# Patient Record
Sex: FEMALE | Race: WHITE | NOT HISPANIC OR LATINO | Employment: OTHER | URBAN - METROPOLITAN AREA
[De-identification: names, ages, dates, MRNs, and addresses within clinical notes are randomized per-mention and may not be internally consistent; named-entity substitution may affect disease eponyms.]

---

## 2018-01-12 ENCOUNTER — ALLSCRIPTS OFFICE VISIT (OUTPATIENT)
Dept: OTHER | Facility: OTHER | Age: 83
End: 2018-01-12

## 2021-02-05 ENCOUNTER — APPOINTMENT (EMERGENCY)
Dept: CT IMAGING | Facility: HOSPITAL | Age: 86
End: 2021-02-05
Payer: MEDICARE

## 2021-02-05 ENCOUNTER — TELEPHONE (OUTPATIENT)
Dept: OTHER | Facility: OTHER | Age: 86
End: 2021-02-05

## 2021-02-05 ENCOUNTER — HOSPITAL ENCOUNTER (EMERGENCY)
Facility: HOSPITAL | Age: 86
End: 2021-02-06
Attending: EMERGENCY MEDICINE | Admitting: EMERGENCY MEDICINE
Payer: MEDICARE

## 2021-02-05 DIAGNOSIS — I60.9 SAH (SUBARACHNOID HEMORRHAGE) (HCC): Primary | ICD-10-CM

## 2021-02-05 DIAGNOSIS — S01.81XA LACERATION OF FOREHEAD, INITIAL ENCOUNTER: ICD-10-CM

## 2021-02-05 LAB
ANION GAP SERPL CALCULATED.3IONS-SCNC: 6 MMOL/L (ref 4–13)
BASOPHILS # BLD AUTO: 0.04 THOUSANDS/ΜL (ref 0–0.1)
BASOPHILS NFR BLD AUTO: 1 % (ref 0–1)
BUN SERPL-MCNC: 23 MG/DL (ref 5–25)
CALCIUM SERPL-MCNC: 8.8 MG/DL (ref 8.3–10.1)
CHLORIDE SERPL-SCNC: 98 MMOL/L (ref 100–108)
CO2 SERPL-SCNC: 30 MMOL/L (ref 21–32)
CREAT SERPL-MCNC: 0.98 MG/DL (ref 0.6–1.3)
EOSINOPHIL # BLD AUTO: 0.35 THOUSAND/ΜL (ref 0–0.61)
EOSINOPHIL NFR BLD AUTO: 5 % (ref 0–6)
ERYTHROCYTE [DISTWIDTH] IN BLOOD BY AUTOMATED COUNT: 15.9 % (ref 11.6–15.1)
GFR SERPL CREATININE-BSD FRML MDRD: 47 ML/MIN/1.73SQ M
GLUCOSE SERPL-MCNC: 110 MG/DL (ref 65–140)
HCT VFR BLD AUTO: 35.5 % (ref 34.8–46.1)
HGB BLD-MCNC: 10.8 G/DL (ref 11.5–15.4)
IMM GRANULOCYTES # BLD AUTO: 0.02 THOUSAND/UL (ref 0–0.2)
IMM GRANULOCYTES NFR BLD AUTO: 0 % (ref 0–2)
LYMPHOCYTES # BLD AUTO: 1.84 THOUSANDS/ΜL (ref 0.6–4.47)
LYMPHOCYTES NFR BLD AUTO: 24 % (ref 14–44)
MCH RBC QN AUTO: 25.4 PG (ref 26.8–34.3)
MCHC RBC AUTO-ENTMCNC: 30.4 G/DL (ref 31.4–37.4)
MCV RBC AUTO: 83 FL (ref 82–98)
MONOCYTES # BLD AUTO: 0.63 THOUSAND/ΜL (ref 0.17–1.22)
MONOCYTES NFR BLD AUTO: 8 % (ref 4–12)
NEUTROPHILS # BLD AUTO: 4.82 THOUSANDS/ΜL (ref 1.85–7.62)
NEUTS SEG NFR BLD AUTO: 62 % (ref 43–75)
NRBC BLD AUTO-RTO: 0 /100 WBCS
PLATELET # BLD AUTO: 306 THOUSANDS/UL (ref 149–390)
PMV BLD AUTO: 9 FL (ref 8.9–12.7)
POTASSIUM SERPL-SCNC: 4.4 MMOL/L (ref 3.5–5.3)
RBC # BLD AUTO: 4.26 MILLION/UL (ref 3.81–5.12)
SODIUM SERPL-SCNC: 134 MMOL/L (ref 136–145)
TROPONIN I SERPL-MCNC: <0.02 NG/ML
WBC # BLD AUTO: 7.7 THOUSAND/UL (ref 4.31–10.16)

## 2021-02-05 PROCEDURE — 80048 BASIC METABOLIC PNL TOTAL CA: CPT | Performed by: EMERGENCY MEDICINE

## 2021-02-05 PROCEDURE — 90471 IMMUNIZATION ADMIN: CPT

## 2021-02-05 PROCEDURE — 99285 EMERGENCY DEPT VISIT HI MDM: CPT

## 2021-02-05 PROCEDURE — 99291 CRITICAL CARE FIRST HOUR: CPT | Performed by: EMERGENCY MEDICINE

## 2021-02-05 PROCEDURE — 96365 THER/PROPH/DIAG IV INF INIT: CPT

## 2021-02-05 PROCEDURE — 85025 COMPLETE CBC W/AUTO DIFF WBC: CPT | Performed by: EMERGENCY MEDICINE

## 2021-02-05 PROCEDURE — 84484 ASSAY OF TROPONIN QUANT: CPT | Performed by: EMERGENCY MEDICINE

## 2021-02-05 PROCEDURE — 36415 COLL VENOUS BLD VENIPUNCTURE: CPT | Performed by: EMERGENCY MEDICINE

## 2021-02-05 PROCEDURE — 70450 CT HEAD/BRAIN W/O DYE: CPT

## 2021-02-05 PROCEDURE — 93005 ELECTROCARDIOGRAM TRACING: CPT

## 2021-02-05 PROCEDURE — 72125 CT NECK SPINE W/O DYE: CPT

## 2021-02-05 PROCEDURE — 90715 TDAP VACCINE 7 YRS/> IM: CPT | Performed by: EMERGENCY MEDICINE

## 2021-02-05 RX ORDER — FAMOTIDINE 20 MG/1
20 TABLET, FILM COATED ORAL 2 TIMES DAILY
COMMUNITY

## 2021-02-05 RX ORDER — PRAMIPEXOLE DIHYDROCHLORIDE 0.12 MG/1
0.12 TABLET ORAL
COMMUNITY

## 2021-02-05 RX ORDER — RIBOFLAVIN (VITAMIN B2) 100 MG
100 TABLET ORAL DAILY
COMMUNITY

## 2021-02-05 RX ORDER — LEVOTHYROXINE SODIUM 0.12 MG/1
125 TABLET ORAL DAILY
COMMUNITY

## 2021-02-05 RX ORDER — SERTRALINE HYDROCHLORIDE 25 MG/1
75 TABLET, FILM COATED ORAL
COMMUNITY

## 2021-02-05 RX ORDER — LIDOCAINE HYDROCHLORIDE AND EPINEPHRINE 10; 10 MG/ML; UG/ML
20 INJECTION, SOLUTION INFILTRATION; PERINEURAL ONCE
Status: COMPLETED | OUTPATIENT
Start: 2021-02-05 | End: 2021-02-05

## 2021-02-05 RX ORDER — ASPIRIN 81 MG/1
81 TABLET, CHEWABLE ORAL DAILY
COMMUNITY
End: 2021-02-06 | Stop reason: HOSPADM

## 2021-02-05 RX ORDER — LORAZEPAM 0.5 MG/1
TABLET ORAL EVERY 8 HOURS PRN
COMMUNITY

## 2021-02-05 RX ORDER — ZINC SULFATE 50(220)MG
220 CAPSULE ORAL DAILY
COMMUNITY

## 2021-02-05 RX ORDER — GABAPENTIN 300 MG/1
300 CAPSULE ORAL 3 TIMES DAILY
COMMUNITY

## 2021-02-05 RX ORDER — SENNA PLUS 8.6 MG/1
1 TABLET ORAL DAILY
COMMUNITY

## 2021-02-05 RX ORDER — AMLODIPINE BESYLATE 5 MG/1
5 TABLET ORAL DAILY
COMMUNITY

## 2021-02-05 RX ORDER — MELATONIN
1000 DAILY
COMMUNITY

## 2021-02-05 RX ADMIN — LIDOCAINE HYDROCHLORIDE AND EPINEPHRINE 20 ML: 10; 10 INJECTION, SOLUTION INFILTRATION; PERINEURAL at 22:27

## 2021-02-05 RX ADMIN — TETANUS TOXOID, REDUCED DIPHTHERIA TOXOID AND ACELLULAR PERTUSSIS VACCINE, ADSORBED 0.5 ML: 5; 2.5; 8; 8; 2.5 SUSPENSION INTRAMUSCULAR at 22:27

## 2021-02-05 RX ADMIN — DESMOPRESSIN ACETATE 21.6 MCG: 4 SOLUTION INTRAVENOUS at 23:21

## 2021-02-06 ENCOUNTER — HOSPITAL ENCOUNTER (INPATIENT)
Facility: HOSPITAL | Age: 86
LOS: 1 days | Discharge: NURSING, REHAB OR LONG-TERM CARE | DRG: 084 | End: 2021-02-07
Attending: SURGERY | Admitting: SURGERY
Payer: MEDICARE

## 2021-02-06 ENCOUNTER — APPOINTMENT (INPATIENT)
Dept: RADIOLOGY | Facility: HOSPITAL | Age: 86
DRG: 084 | End: 2021-02-06
Payer: MEDICARE

## 2021-02-06 VITALS
SYSTOLIC BLOOD PRESSURE: 136 MMHG | RESPIRATION RATE: 20 BRPM | DIASTOLIC BLOOD PRESSURE: 60 MMHG | TEMPERATURE: 98.7 F | HEART RATE: 72 BPM | OXYGEN SATURATION: 96 %

## 2021-02-06 VITALS
HEART RATE: 63 BPM | DIASTOLIC BLOOD PRESSURE: 72 MMHG | BODY MASS INDEX: 29.21 KG/M2 | OXYGEN SATURATION: 94 % | HEIGHT: 62 IN | RESPIRATION RATE: 18 BRPM | SYSTOLIC BLOOD PRESSURE: 164 MMHG | WEIGHT: 158.73 LBS | TEMPERATURE: 97.1 F
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
## 2021-02-06 DIAGNOSIS — S06.6X9A TRAUMATIC SUBARACHNOID HEMORRHAGE (HCC): ICD-10-CM

## 2021-02-06 PROBLEM — S01.81XA FOREHEAD LACERATION: Status: ACTIVE | Noted: 2021-02-06

## 2021-02-06 LAB
ALBUMIN SERPL BCP-MCNC: 3.5 G/DL (ref 3.5–5)
ALP SERPL-CCNC: 63 U/L (ref 46–116)
ALT SERPL W P-5'-P-CCNC: 12 U/L (ref 12–78)
ANION GAP SERPL CALCULATED.3IONS-SCNC: 7 MMOL/L (ref 4–13)
AST SERPL W P-5'-P-CCNC: 13 U/L (ref 5–45)
BASOPHILS # BLD AUTO: 0.04 THOUSANDS/ΜL (ref 0–0.1)
BASOPHILS NFR BLD AUTO: 1 % (ref 0–1)
BILIRUB SERPL-MCNC: 0.47 MG/DL (ref 0.2–1)
BUN SERPL-MCNC: 28 MG/DL (ref 5–25)
CALCIUM SERPL-MCNC: 9.1 MG/DL (ref 8.3–10.1)
CHLORIDE SERPL-SCNC: 105 MMOL/L (ref 100–108)
CO2 SERPL-SCNC: 27 MMOL/L (ref 21–32)
CREAT SERPL-MCNC: 1.12 MG/DL (ref 0.6–1.3)
EOSINOPHIL # BLD AUTO: 0.27 THOUSAND/ΜL (ref 0–0.61)
EOSINOPHIL NFR BLD AUTO: 3 % (ref 0–6)
ERYTHROCYTE [DISTWIDTH] IN BLOOD BY AUTOMATED COUNT: 16.2 % (ref 11.6–15.1)
FLUAV RNA RESP QL NAA+PROBE: NEGATIVE
FLUBV RNA RESP QL NAA+PROBE: NEGATIVE
GFR SERPL CREATININE-BSD FRML MDRD: 40 ML/MIN/1.73SQ M
GLUCOSE SERPL-MCNC: 117 MG/DL (ref 65–140)
HCT VFR BLD AUTO: 32.1 % (ref 34.8–46.1)
HGB BLD-MCNC: 9.8 G/DL (ref 11.5–15.4)
IMM GRANULOCYTES # BLD AUTO: 0.02 THOUSAND/UL (ref 0–0.2)
IMM GRANULOCYTES NFR BLD AUTO: 0 % (ref 0–2)
LYMPHOCYTES # BLD AUTO: 1.79 THOUSANDS/ΜL (ref 0.6–4.47)
LYMPHOCYTES NFR BLD AUTO: 22 % (ref 14–44)
MCH RBC QN AUTO: 25.4 PG (ref 26.8–34.3)
MCHC RBC AUTO-ENTMCNC: 30.5 G/DL (ref 31.4–37.4)
MCV RBC AUTO: 83 FL (ref 82–98)
MONOCYTES # BLD AUTO: 0.79 THOUSAND/ΜL (ref 0.17–1.22)
MONOCYTES NFR BLD AUTO: 10 % (ref 4–12)
NEUTROPHILS # BLD AUTO: 5.41 THOUSANDS/ΜL (ref 1.85–7.62)
NEUTS SEG NFR BLD AUTO: 64 % (ref 43–75)
NRBC BLD AUTO-RTO: 0 /100 WBCS
PLATELET # BLD AUTO: 303 THOUSANDS/UL (ref 149–390)
PMV BLD AUTO: 9 FL (ref 8.9–12.7)
POTASSIUM SERPL-SCNC: 4.2 MMOL/L (ref 3.5–5.3)
PROT SERPL-MCNC: 7.2 G/DL (ref 6.4–8.2)
RBC # BLD AUTO: 3.86 MILLION/UL (ref 3.81–5.12)
RSV RNA RESP QL NAA+PROBE: NEGATIVE
SARS-COV-2 RNA RESP QL NAA+PROBE: NEGATIVE
SODIUM SERPL-SCNC: 139 MMOL/L (ref 136–145)
WBC # BLD AUTO: 8.32 THOUSAND/UL (ref 4.31–10.16)

## 2021-02-06 PROCEDURE — 0241U HB NFCT DS VIR RESP RNA 4 TRGT: CPT | Performed by: SURGERY

## 2021-02-06 PROCEDURE — 12013 RPR F/E/E/N/L/M 2.6-5.0 CM: CPT | Performed by: EMERGENCY MEDICINE

## 2021-02-06 PROCEDURE — NC001 PR NO CHARGE: Performed by: NURSE PRACTITIONER

## 2021-02-06 PROCEDURE — 80053 COMPREHEN METABOLIC PANEL: CPT | Performed by: EMERGENCY MEDICINE

## 2021-02-06 PROCEDURE — 70450 CT HEAD/BRAIN W/O DYE: CPT

## 2021-02-06 PROCEDURE — 99285 EMERGENCY DEPT VISIT HI MDM: CPT

## 2021-02-06 PROCEDURE — 36415 COLL VENOUS BLD VENIPUNCTURE: CPT | Performed by: EMERGENCY MEDICINE

## 2021-02-06 PROCEDURE — G1004 CDSM NDSC: HCPCS

## 2021-02-06 PROCEDURE — 99222 1ST HOSP IP/OBS MODERATE 55: CPT | Performed by: SURGERY

## 2021-02-06 PROCEDURE — 99204 OFFICE O/P NEW MOD 45 MIN: CPT | Performed by: PHYSICIAN ASSISTANT

## 2021-02-06 PROCEDURE — 85025 COMPLETE CBC W/AUTO DIFF WBC: CPT | Performed by: EMERGENCY MEDICINE

## 2021-02-06 RX ORDER — SENNOSIDES 8.6 MG
2 TABLET ORAL
Status: DISCONTINUED | OUTPATIENT
Start: 2021-02-06 | End: 2021-02-07 | Stop reason: HOSPADM

## 2021-02-06 RX ORDER — MULTIVIT WITH MINERALS/LUTEIN
125 TABLET ORAL DAILY
Status: DISCONTINUED | OUTPATIENT
Start: 2021-02-06 | End: 2021-02-07 | Stop reason: HOSPADM

## 2021-02-06 RX ORDER — AMLODIPINE BESYLATE 5 MG/1
5 TABLET ORAL DAILY
Status: DISCONTINUED | OUTPATIENT
Start: 2021-02-06 | End: 2021-02-07 | Stop reason: HOSPADM

## 2021-02-06 RX ORDER — FAMOTIDINE 20 MG/1
20 TABLET, FILM COATED ORAL DAILY
Status: DISCONTINUED | OUTPATIENT
Start: 2021-02-06 | End: 2021-02-07 | Stop reason: HOSPADM

## 2021-02-06 RX ORDER — MELATONIN
1000 DAILY
Status: DISCONTINUED | OUTPATIENT
Start: 2021-02-06 | End: 2021-02-07 | Stop reason: HOSPADM

## 2021-02-06 RX ORDER — ZINC SULFATE 50(220)MG
220 CAPSULE ORAL DAILY
Status: DISCONTINUED | OUTPATIENT
Start: 2021-02-06 | End: 2021-02-07 | Stop reason: HOSPADM

## 2021-02-06 RX ORDER — ACETAMINOPHEN 325 MG/1
975 TABLET ORAL EVERY 8 HOURS PRN
Status: DISCONTINUED | OUTPATIENT
Start: 2021-02-06 | End: 2021-02-07 | Stop reason: HOSPADM

## 2021-02-06 RX ORDER — LEVOTHYROXINE SODIUM 0.12 MG/1
125 TABLET ORAL
Status: DISCONTINUED | OUTPATIENT
Start: 2021-02-06 | End: 2021-02-07 | Stop reason: HOSPADM

## 2021-02-06 RX ORDER — GABAPENTIN 300 MG/1
300 CAPSULE ORAL 3 TIMES DAILY PRN
Status: DISCONTINUED | OUTPATIENT
Start: 2021-02-06 | End: 2021-02-07 | Stop reason: HOSPADM

## 2021-02-06 RX ORDER — LORAZEPAM 0.5 MG/1
0.5 TABLET ORAL EVERY 8 HOURS PRN
Status: DISCONTINUED | OUTPATIENT
Start: 2021-02-06 | End: 2021-02-07 | Stop reason: HOSPADM

## 2021-02-06 RX ORDER — GINSENG 100 MG
1 CAPSULE ORAL 2 TIMES DAILY
Status: DISCONTINUED | OUTPATIENT
Start: 2021-02-06 | End: 2021-02-07 | Stop reason: HOSPADM

## 2021-02-06 RX ORDER — NYSTATIN 100000 [USP'U]/G
POWDER TOPICAL 2 TIMES DAILY PRN
Qty: 15 G | Refills: 0 | Status: SHIPPED | OUTPATIENT
Start: 2021-02-06

## 2021-02-06 RX ORDER — PRAMIPEXOLE DIHYDROCHLORIDE 0.25 MG/1
0.12 TABLET ORAL
Status: DISCONTINUED | OUTPATIENT
Start: 2021-02-06 | End: 2021-02-07 | Stop reason: HOSPADM

## 2021-02-06 RX ORDER — NYSTATIN 100000 [USP'U]/G
POWDER TOPICAL 2 TIMES DAILY PRN
Qty: 15 G | Refills: 0 | Status: SHIPPED | OUTPATIENT
Start: 2021-02-06 | End: 2021-02-06

## 2021-02-06 RX ORDER — ACETAMINOPHEN 325 MG/1
975 TABLET ORAL EVERY 8 HOURS PRN
Qty: 30 TABLET | Refills: 0 | Status: SHIPPED | OUTPATIENT
Start: 2021-02-06

## 2021-02-06 RX ORDER — MINERAL OIL AND PETROLATUM 150; 830 MG/G; MG/G
OINTMENT OPHTHALMIC
Status: DISCONTINUED | OUTPATIENT
Start: 2021-02-06 | End: 2021-02-07 | Stop reason: HOSPADM

## 2021-02-06 RX ORDER — NYSTATIN 100000 [USP'U]/G
POWDER TOPICAL 2 TIMES DAILY PRN
Status: DISCONTINUED | OUTPATIENT
Start: 2021-02-06 | End: 2021-02-07 | Stop reason: HOSPADM

## 2021-02-06 RX ORDER — ACETAMINOPHEN 325 MG/1
975 TABLET ORAL EVERY 8 HOURS PRN
Qty: 30 TABLET | Refills: 0 | Status: SHIPPED | OUTPATIENT
Start: 2021-02-06 | End: 2021-02-06

## 2021-02-06 RX ORDER — BISACODYL 10 MG
10 SUPPOSITORY, RECTAL RECTAL DAILY PRN
Status: DISCONTINUED | OUTPATIENT
Start: 2021-02-06 | End: 2021-02-07 | Stop reason: HOSPADM

## 2021-02-06 RX ORDER — POLYETHYLENE GLYCOL 3350 17 G/17G
17 POWDER, FOR SOLUTION ORAL DAILY PRN
Status: DISCONTINUED | OUTPATIENT
Start: 2021-02-06 | End: 2021-02-07 | Stop reason: HOSPADM

## 2021-02-06 RX ADMIN — AMLODIPINE BESYLATE 5 MG: 5 TABLET ORAL at 11:49

## 2021-02-06 RX ADMIN — LORAZEPAM 0.5 MG: 0.5 TABLET ORAL at 11:54

## 2021-02-06 RX ADMIN — LEVOTHYROXINE SODIUM 125 MCG: 125 TABLET ORAL at 11:47

## 2021-02-06 RX ADMIN — METOPROLOL TARTRATE 25 MG: 25 TABLET, FILM COATED ORAL at 11:54

## 2021-02-06 RX ADMIN — CARBIDOPA AND LEVODOPA 1 TABLET: 25; 100 TABLET ORAL at 11:49

## 2021-02-06 NOTE — ASSESSMENT & PLAN NOTE
Small 5 mm left frontoparietal hyperdensity concerning for traumatic hemorrhage  · Reported fall from wheelchair on aspirin 81 mg daily  · Patient unable to offer complaints    Imaging:  · CT head without 2/5/21:  Four 5 mm for the hyperdensity in the left frontoparietal convexity noted both on axial and coronal imaging concerning for traumatic hemorrhage  Age-related atrophy and prominent ventricles  Plan:  · Continue monitor exam  · Per conversation with trauma, patient appears grossly at baseline  · CT head imaging reviewed  Primary team has ordered repeat scan for 9:00 a m  Manny Pierce · Hold all anti-platelet and anticoagulation medications for two weeks  · Recommend risk versus benefit conversation regarding resuming aspirin with family physician  · Would defer Keppra given age of small hemorrhage  · No nerve surgical intervention indicated  Patient may follow up on as-needed basis  · Call if any questions or concerns

## 2021-02-06 NOTE — OCCUPATIONAL THERAPY NOTE
Occupational Therapy         Patient Name: Josemanuel Hodge  LJZDI'H Date: 2/6/2021 02/06/21 1128   OT Last Visit   OT Visit Date 02/06/21   Note Type   Note type Screen   Cancel Reasons Other     Pt is a LT resident of [address] [place] [address] is for pt to return to facility later today - no acute OT needs indicated at this time - d/c from caseload    Candy Caldwell

## 2021-02-06 NOTE — CONSULTS
Consult- Soham Lanza 5/21/1919, 80 y o  female MRN: [id number]    Unit/Bed#: ED 27 Encounter: [id number]    Primary Care Provider: Mariano Diaz MD   Date and time admitted to hospital: 2/6/2021 12:31 AM    Patient seen and examined 2/6/21 approx 0745am    ADDENDUM: Repeat CT head completed and reviewed  Final report pending  On personal review, hemorrhage appears grossly stable  No follow-up needed  Okay for dc from neurosurgical standpoint  Inpatient consult to Neurosurgery  Consult performed by: Israel Rouse PA-C  Consult ordered by: Maegan Servin DO        * Traumatic subarachnoid hemorrhage [facility])  Assessment & Plan  Small 5 mm left frontoparietal hyperdensity concerning for traumatic hemorrhage  · Reported fall from wheelchair on aspirin 81 mg daily  · Patient unable to offer complaints    Imaging:  · CT head without 2/5/21:  Four 5 mm for the hyperdensity in the left frontoparietal convexity noted both on axial and coronal imaging concerning for traumatic hemorrhage  Age-related atrophy and prominent ventricles  Plan:  · Continue monitor exam  · Per conversation with trauma, patient appears grossly at baseline  · CT head imaging reviewed  Primary team has ordered repeat scan for 9:00 a m  Enrique Maravilla · Hold all anti-platelet and anticoagulation medications for two weeks  · Recommend risk versus benefit conversation regarding resuming aspirin with family physician  · Would defer Keppra given age of small hemorrhage  · No nerve surgical intervention indicated  Patient may follow up on as-needed basis  · Call if any questions or concerns  History of Present Illness     HPI: Soham Lanza is a 80 y o  female with PMH including TIA on aspirin 81 mg daily, Parkinson's disease, history of myocardial infarction, hypothyroidism, COPD, Alzheimer's disease who presents following fall from wheelchair  Patient of progressive Alzheimer's disease and unable to provide history    Per chart review patient sustained a fall from her wheelchair  She presented originally to upper back for a CT head demonstrated a small subarachnoid hemorrhage  She was given DDAVP for reversal of aspirin use  Her facial laceration was repaired in the emergency room and patient was transferred to [facility] for ongoing evaluation  Review of Systems   Unable to perform ROS: Dementia       Historical Information   Past Medical History:   Diagnosis Date    Alzheimer disease ([place] [address] )     Anxiety     Atherosclerotic heart disease of native coronary artery without angina pectoris     COPD (chronic obstructive pulmonary disease) ([place] [address] )     Hypothyroidism     Long term (current) use of systemic steroids     Major depressive disorder     MI (myocardial infarction) ([place] [address] )     Parkinson disease ([place] [address] )     Spinal stenosis     TIA (transient ischemic attack)      History reviewed  No pertinent surgical history  Social History     Substance and Sexual Activity   Alcohol Use Not Currently     Social History     Substance and Sexual Activity   Drug Use Not Currently     Social History     Tobacco Use   Smoking Status Former Smoker   Smokeless Tobacco Never Used     History reviewed  No pertinent family history      Meds/Allergies   all current active meds have been reviewed and current meds:   Current Facility-Administered Medications   Medication Dose Route Frequency    acetaminophen (TYLENOL) tablet 975 mg  975 mg Oral Q8H PRN    amLODIPine (NORVASC) tablet 5 mg  5 mg Oral Daily    artificial tear (LUBRIFRESH P M ) ophthalmic ointment   Both Eyes HS    ascorbic acid (VITAMIN C) tablet 125 mg  125 mg Oral Daily    bacitracin topical ointment 1 small application  1 small application Topical BID    bisacodyl (DULCOLAX) rectal suppository 10 mg  10 mg Rectal Daily PRN    carbidopa-levodopa (SINEMET)  mg per tablet 1 tablet  1 tablet Oral TID    cholecalciferol (VITAMIN D3) tablet 1,000 Units  1,000 Units Oral Daily  famotidine (PEPCID) tablet 20 mg  20 mg Oral Daily    gabapentin (NEURONTIN) capsule 300 mg  300 mg Oral TID PRN    levothyroxine tablet 125 mcg  125 mcg Oral Early Morning    LORazepam (ATIVAN) tablet 0 5 mg  0 5 mg Oral Q8H PRN    metoprolol tartrate (LOPRESSOR) tablet 25 mg  25 mg Oral Q12H JEMAL    nystatin (MYCOSTATIN) powder   Topical BID PRN    polyethylene glycol (MIRALAX) packet 17 g  17 g Oral Daily PRN    pramipexole (MIRAPEX) tablet 0 125 mg  0 125 mg Oral HS    senna (SENOKOT) tablet 17 2 mg  2 tablet Oral HS    sertraline (ZOLOFT) tablet 75 mg  75 mg Oral HS    zinc sulfate (ZINCATE) capsule 220 mg  220 mg Oral Daily     No Known Allergies    Objective   I/O     None          Physical Exam  Constitutional:       General: She is not in acute distress  Appearance: She is not ill-appearing  HENT:      Head:      Comments: Forehead laceration     Left Ear: External ear normal       Nose: Nose normal       Mouth/Throat:      Mouth: Mucous membranes are moist    Neck:      Musculoskeletal: No muscular tenderness  Cardiovascular:      Rate and Rhythm: Normal rate  Pulmonary:      Effort: Pulmonary effort is normal  No respiratory distress  Abdominal:      Palpations: Abdomen is soft  Musculoskeletal:         General: No tenderness  Skin:     General: Skin is warm and dry  Neurological:      Mental Status: Mental status is at baseline  Psychiatric:         Behavior: Behavior normal        Neurologic Exam     Mental Status   Attention: decreased  Concentration: decreased  Level of consciousness: drowsy  Knowledge: poor  Abnormal comprehension  Intermittent words but does not answer questions  Does not follow verbal commands  Cranial Nerves     CN III, IV, VI   Right pupil: Shape: regular  Left pupil: Shape: regular     Conjugate gaze: present    CN VII   Right facial weakness: none  Left facial weakness: none    Motor Exam Small spontaneous movements BUE  Will move LE to tactile stimuli   Limited exam     Sensory Exam   Appears intact to crude touch as she moves extremity to palpation     Gait, Coordination, and Reflexes     Tremor   Resting tremor: present  Intention tremor: absent  Action tremor: absent    Reflexes   Right Coates: absent  Left Coates: absent  Right ankle clonus: absent  Left ankle clonus: absent        Vitals:Blood pressure 159/87, pulse 72, temperature 98 7 °F (37 1 °C), temperature source Oral, resp  rate 16, SpO2 92 %  ,There is no height or weight on file to calculate BMI  Lab Results:   Results from last 7 days   Lab Units 02/06/21  0541 02/05/21  2139   WBC Thousand/uL 8 32 7 70   HEMOGLOBIN g/dL 9 8* 10 8*   HEMATOCRIT % 32 1* 35 5   PLATELETS Thousands/uL 303 306   NEUTROS PCT % 64 62   MONOS PCT % 10 8     Results from last 7 days   Lab Units 02/06/21  0541 02/05/21  2139   POTASSIUM mmol/L 4 2 4 4   CHLORIDE mmol/L 105 98*   CO2 mmol/L 27 30   BUN mg/dL 28* 23   CREATININE mg/dL 1 12 0 98   CALCIUM mg/dL 9 1 8 8   ALK PHOS U/L 63  --    ALT U/L 12  --    AST U/L 13  --                  No results found for: TROPONINT  ABG:No results found for: PHART, USL0EUO, PO2ART, QEM3HWD, P0TKJJCK, BEART, SOURCE    Imaging Studies: I have personally reviewed pertinent reports  and I have personally reviewed pertinent films in PACS    CT head wo   CT cervical wo    EKG, Pathology, and Other Studies: none    VTE Prophylaxis: Reason for no pharmacologic prophylaxis SAH vs ICH    Code Status: Level 3 - DNAR and DNI  Advance Directive and Living Will:      Power of :    POLST:      Counseling / Coordination of Care  I spent 15 minutes with the patient

## 2021-02-06 NOTE — EMTALA/ACUTE CARE TRANSFER
[facility] EMERGENCY DEPARTMENT  [address] [facility] [address]  Dept: [phone]      [facility] TRANSFER CONSENT    NAME Mikayla Robledo                                         1919                              MRN [id number]    I have been informed of my rights regarding examination, treatment, and transfer   by Dr Boni Pappas DO    Benefits: Specialized equipment and/or services available at the receiving facility (Include comment)________________________    Risks: Potential for delay in receiving treatment, Potential deterioration of medical condition, Loss of IV, Increased discomfort during transfer      Consent for Transfer:  I acknowledge that my medical condition has been evaluated and explained to me by the emergency department physician or other qualified medical person and/or my attending physician, who has recommended that I be transferred to the service of  Accepting Physician: Dr Pedro Leyden at [address], [address] : [facility]  The above potential benefits of such transfer, the potential risks associated with such transfer, and the probable risks of not being transferred have been explained to me, and I fully understand them  The doctor has explained that, in my case, the benefits of transfer outweigh the risks  I agree to be transferred  I authorize the performance of emergency medical procedures and treatments upon me in both transit and upon arrival at the receiving facility  Additionally, I authorize the release of any and all medical records to the receiving facility and request they be transported with me, if possible  I understand that the safest mode of transportation during a medical emergency is an ambulance and that the Hospital advocates the use of this mode of transport   Risks of traveling to the receiving facility by car, including absence of medical control, life sustaining equipment, such as oxygen, and medical personnel has been explained to me and I fully understand them  (KRYSTIN CORRECT BOX BELOW)  [  ]  I consent to the stated transfer and to be transported by ambulance/helicopter  [  ]  I consent to the stated transfer, but refuse transportation by ambulance and accept full responsibility for my transportation by car  I understand the risks of non-ambulance transfers and I exonerate the Hospital and its staff from any deterioration in my condition that results from this refusal     X___________________________________________    DATE  21  TIME________  Signature of patient or legally responsible individual signing on patient behalf           RELATIONSHIP TO PATIENT_________________________          Provider Certification    NAME Davis Luis                                         1919                              MRN [id number]    A medical screening exam was performed on the above named patient  Based on the examination:    Condition Necessitating Transfer The primary encounter diagnosis was SAH (subarachnoid hemorrhage) ([facility] [address] )  A diagnosis of Laceration of forehead, initial encounter was also pertinent to this visit      Patient Condition: The patient has been stabilized such that within reasonable medical probability, no material deterioration of the patient condition or the condition of the unborn child(elena) is likely to result from the transfer    Reason for Transfer: Level of Care needed not available at this facility    Transfer Requirements: Facility [facility]   · Space available and qualified personnel available for treatment as acknowledged by    · Agreed to accept transfer and to provide appropriate medical treatment as acknowledged by       Dr Kaylin Kennedy  · Appropriate medical records of the examination and treatment of the patient are provided at the time of transfer   [address] _______  · Transfer will be performed by qualified personnel from    and appropriate transfer equipment as required, including the use of necessary and appropriate life support measures  Provider Certification: I have examined the patient and explained the following risks and benefits of being transferred/refusing transfer to the patient/family:  General risk, such as traffic hazards, adverse weather conditions, rough terrain or turbulence, possible failure of equipment (including vehicle or aircraft), or consequences of actions of persons outside the control of the transport personnel, Unanticipated needs of medical equipment and personnel during transport, Risk of worsening condition, The possibility of a transport vehicle being unavailable      Based on these reasonable risks and benefits to the patient and/or the unborn child(elena), and based upon the information available at the time of the patients examination, I certify that the medical benefits reasonably to be expected from the provision of appropriate medical treatments at another medical facility outweigh the increasing risks, if any, to the individuals medical condition, and in the case of labor to the unborn child, from effecting the transfer      X____________________________________________ DATE 02/05/21        TIME_______      ORIGINAL - SEND TO MEDICAL RECORDS   COPY - SEND WITH PATIENT DURING TRANSFER

## 2021-02-06 NOTE — ASSESSMENT & PLAN NOTE
-Fall with SAH, given DDAVP prior to transfer  -Hold ASA/VTE prophylaxis   -Consult neurosurgery  - seen patient  -Follow up head CT in AM - will discuss with Neurosurgery  - Would recommend holding Aspirin, follow up with PCP

## 2021-02-06 NOTE — DISCHARGE INSTRUCTIONS
Laceration   WHAT YOU NEED TO KNOW:   A laceration is an injury to the skin and the soft tissue underneath it  Lacerations can happen anywhere on the body  DISCHARGE INSTRUCTIONS:   Return to the emergency department if:   · You have heavy bleeding or bleeding that does not stop after 10 minutes of holding firm, direct pressure over the wound  · Your wound opens up  Call your doctor if:   · You have a fever or chills  · Your laceration is red, warm, or swollen  · You have red streaks on your skin coming from your wound  · You have white or yellow drainage from the wound that smells bad  · You have pain that gets worse, even after treatment  · You have questions or concerns about your condition or care  Medicines: You may need any of the following:  · Prescription pain medicine  may be given  Ask your healthcare provider how to take this medicine safely  Some prescription pain medicines contain acetaminophen  Do not take other medicines that contain acetaminophen without talking to your healthcare provider  Too much acetaminophen may cause liver damage  Prescription pain medicine may cause constipation  Ask your healthcare provider how to prevent or treat constipation  · Antibiotics  help treat or prevent a bacterial infection  · Take your medicine as directed  Contact your healthcare provider if you think your medicine is not helping or if you have side effects  Tell him or her if you are allergic to any medicine  Keep a list of the medicines, vitamins, and herbs you take  Include the amounts, and when and why you take them  Bring the list or the pill bottles to follow-up visits  Carry your medicine list with you in case of an emergency  Care for your wound as directed:   · Do not get your wound wet  until your healthcare provider says it is okay  Do not soak your wound in water  Do not go swimming until your healthcare provider says it is okay   Carefully wash the wound with soap and water  Gently pat the area dry or allow it to air dry  · Change your bandages  when they get wet, dirty, or after washing  Apply new, clean bandages as directed  Do not apply elastic bandages or tape too tight  Do not put powders or lotions over your incision  · Apply antibiotic ointment as directed  Your healthcare provider may give you antibiotic ointment to put over your wound if you have stitches  If you have strips of tape over your incision, let them dry up and fall off on their own  If they do not fall off within 14 days, gently remove them  If you have glue over your wound, do not remove or pick at it  If your glue comes off, do not replace it with glue that you have at home  · Check your wound every day for signs of infection, such as swelling, redness, or pus  Self-care:   · Apply ice  on your wound for 15 to 20 minutes every hour or as directed  Use an ice pack, or put crushed ice in a plastic bag  Cover it with a towel  Ice helps prevent tissue damage and decreases swelling and pain  · Use a splint as directed  A splint will decrease movement and stress on your wound  It may help it heal faster  A splint may be used for lacerations over joints or areas of your body that bend  Ask your healthcare provider how to apply and remove a splint  · Decrease scarring of your wound  by applying ointments as directed  Do not apply ointments until your healthcare provider says it is okay  You may need to wait until your wound is healed  Ask which ointment to buy and how often to use it  After your wound is healed, use sunscreen over the area when you are out in the sun  You should do this for at least 6 months to 1 year after your injury  Follow up with your doctor as directed: You may need to follow up in 24 to 48 hours to have your wound checked for infection  You will need to return in 3 to 14 days if you have stitches or staples so they can be removed   Care for your wound as directed to prevent infection and help it heal  Write down your questions so you remember to ask them during your visits  © Copyright [address] Information is for End User's use only and may not be sold, redistributed or otherwise used for commercial purposes  All illustrations and images included in CareNotes® are the copyrighted property of A D A M , Inc  or [facility]   The above information is an  only  It is not intended as medical advice for individual conditions or treatments  Talk to your doctor, nurse or pharmacist before following any medical regimen to see if it is safe and effective for you  Neurosurgery discharge instructions following traumatic head bleed:      Do not take any blood thinning medications for 2 weeks (ie  No Advil  No motrin  No ibuprofen  No Aleve  No Aspirin  No fishoil  No heparin  No antiplatelet / no anticoagulation medication)  consider indefinite discontinuation - discuss with family doctor   Refrain from activity that increases chance of trauma to head or falls  Recommend you take fall precaution   No strenuous activity or sports   Return to hospital Emergency Room if you experience worsening / new headache, nausea/vomiting, speech/vision change, seizure, confusion / mental status change, weakness, or other neurological changes

## 2021-02-06 NOTE — CASE MANAGEMENT
Cm rec'd message from medical team  Per medical team, pt is medically stable to discharged  Pt from [facility]  Return referral placed  Per facility, facility is willing to accept pt back and require covid check prior to discharged  Unit RN made aware of covid check prior to discharged  Facility rec'd covid result  Pt to room 232  [phone] for report    Transportation set up with [facility] at 1400

## 2021-02-06 NOTE — ED NOTES
Transport: [facility] 0030 to [facility] ED     Accepting physician is Dr Patience Boothe    [phone] for nurse to nurse report     Jamel Mcbride RN  02/05/21 [phone]

## 2021-02-06 NOTE — H&P
H&P Exam - Tere Herrera 30 [place] [address]  female MRN: [id number]  Unit/Bed#: ED 27 Encounter: [id number]    Assessment/Plan   Trauma Alert: Other transfer   Model of Arrival: Ambulance  Trauma Team: Residents Mart Mixon, Attending [employer]  Consultants: None    Trauma Active Problems: [address]    Trauma Plan: consult neurosurgery, hold ASA/VTE prophylaxis, f/u head CT in am     Chief Complaint: no complaints    History of Present Illness   HPI:  Jie Ortez is a [place] y o  female with a past medical history of Parkinson's disease, dementia, HTN, CAD on 81mg ASA daily who initially presented to [place] [address] for fall from her wheelchair  She had a head CT which revealed small SAH, given DDAVP for reversal  She had a facial laceration that was repaired in the ED  subsequently transferred to [facility] for trauma evaluation  Further history and ROS unavailable secondary to dementia  Mechanism:Fall    Review of Systems   Unable to perform ROS: Dementia       12-point, complete review of systems was reviewed and negative except as stated above  Historical Information   History is unobtainable from the patient due to dementia  Efforts to obtain history included the following sources: obtained from other records    Past Medical History:   Diagnosis Date    [facility]     Anxiety     Atherosclerotic heart disease of native coronary artery without angina pectoris     [facility]ch[facility] obstructive [facility]     Hypothyroidism     Long term (current) use of systemic steroids     Major depressive disorder     [facility]ocardial infarction) [facility]     Parkinson disease [facility]     Spinal stenosis     TIA (transient ischemic attack)      History reviewed  No pertinent surgical history    Social History   Social History     Substance and Sexual Activity   Alcohol Use Not Currently     Social History     Substance and Sexual Activity   Drug Use Not Currently     Social History     Tobacco Use   Smoking Status Former Smoker   Smokeless Tobacco Never Used     E-Cigarette/Vaping     E-Cigarette/Vaping Substances    Nicotine No     THC No     CBD No     Flavoring No     Other No     Unknown No      Immunization History   Administered Date(s) Administered    Tdap 02/05/2021     Last Tetanus: 2/5/2021  Family History: Non-contributory  Unable to obtain/limited by: dementia      Meds/Allergies   all current active meds have been reviewed    No Known Allergies      PHYSICAL EXAM    Objective   Vitals:   First set: Temperature: (!) 97 3 °F (36 3 °C) (02/06/21 0050)  Pulse: 70 (02/06/21 0050)  Respirations: 18 (02/06/21 0050)  Blood Pressure: 129/62 (02/06/21 0050)    Primary Survey:   (A) Airway: intact  (B) Breathing: bilateral symmetric breath sounds  (C) Circulation: Pulses:   normal  (D) Disabliity:  GCS Total:  14, Eye Opening:   Spontaneous = 4, Motor Response: Obeys commands = 6 and Verbal Response:  Confused = 4  (E) Expose:  Completed    Secondary Survey:  Physical Exam  Vitals signs reviewed  Constitutional:       Appearance: She is well-developed  She is not diaphoretic  HENT:      Head: Normocephalic  Laceration present  Right Ear: Tympanic membrane normal  No hemotympanum  Left Ear: Tympanic membrane normal  No hemotympanum  Nose: Nose normal       Mouth/Throat:      Pharynx: Uvula midline  Eyes:      Conjunctiva/sclera: Conjunctivae normal       Pupils: Pupils are equal, round, and reactive to light  Neck:      Musculoskeletal: Normal range of motion and neck supple  No spinous process tenderness  Trachea: Phonation normal    Cardiovascular:      Rate and Rhythm: Normal rate and regular rhythm  Pulses:           Carotid pulses are 2+ on the right side and 2+ on the left side  Femoral pulses are 2+ on the right side and 2+ on the left side  Dorsalis pedis pulses are 2+ on the right side and 2+ on the left side  Heart sounds: Normal heart sounds   No murmur  Pulmonary:      Effort: Pulmonary effort is normal       Breath sounds: Normal breath sounds  Chest:      Chest wall: No tenderness or crepitus  Abdominal:      General: Bowel sounds are normal       Palpations: Abdomen is soft  Abdomen is not rigid  Tenderness: There is no abdominal tenderness  Musculoskeletal: Normal range of motion  Right shoulder: Normal       Left shoulder: Normal       Right elbow: Normal      Left elbow: Normal       Right wrist: Normal       Left wrist: Normal       Right hip: Normal       Left hip: Normal       Right knee: Normal       Left knee: Normal       Right ankle: Normal       Left ankle: Normal       Cervical back: Normal       Thoracic back: Normal       Lumbar back: Normal    Skin:     General: Skin is warm and dry  Neurological:      General: No focal deficit present  Mental Status: She is alert  Mental status is at baseline  She is disoriented  GCS: GCS eye subscore is 4  GCS verbal subscore is 5  GCS motor subscore is 6  Cranial Nerves: No cranial nerve deficit  Sensory: No sensory deficit  Comments: Patient is alert and oriented only to self  Speech is normal with no dysarthria, no aphasia  Cranial nerves II-XII intact  Sensation is intact bilaterally upper and lower extremities  Normal muscle tone, no clonus, no atrophy  5/5 muscle strength bilaterally upper extremities, 5/5 muscle strength bilaterally lower extremities  No pronator drift  Psychiatric:         Behavior: Behavior normal          Invasive Devices     Peripheral Intravenous Line            Peripheral IV 02/05/21 Right Forearm less than 1 day                Lab Results: Results: I have personally reviewed pertinent reports  Imaging/EKG Studies: Results: I have personally reviewed pertinent reports        Code Status: Level 3 - DNAR and DNI  Advance Directive and Living Will:      Power of :    POLST:

## 2021-02-06 NOTE — PROGRESS NOTES
Progress Note - Cathie Files 5/21/1919, 80 y o  female MRN: [id number]    Unit/Bed#: ED 27 Encounter: [id number]    Primary Care Provider: Clarisa Solomon MD   Date and time admitted to hospital: 2/6/2021 12:31 AM        Fall from standing  Assessment & Plan  -PT/OT  -Gerontology consult -  From [place]    Forehead laceration  Assessment & Plan  -S/P laceration repair prior to transfer  -Wound care, topical Bacitracin BID  - area around wound cleansed and dried blood removed  There is a slight hematoma around the area  - Patient opens her eyes    * Traumatic subarachnoid hemorrhage [facility])  Assessment & Plan  -Fall with SAH, given DDAVP prior to transfer  -Hold ASA/VTE prophylaxis   -Consult neurosurgery  - seen patient  -Follow up head CT in AM - will discuss with Neurosurgery  - Would recommend holding Aspirin, follow up with PCP        TERTIARY TRAUMA SURVEY NOTE    Prophylaxis: Sequential compression device (Venodyne)     Disposition: back to [facility]    Code status:  Level 3 - DNAR and DNI    Consultants: Neurosurgery, Geriatrics    Is the patient 65 years or older?: YES:    1  Before the illness or injury that brought you to the Emergency, did you need someone to help you on a regular basis? 1=Yes   2  Since the illness or injury that brought you to the Emergency, have you needed more help than usual to take care of yourself? 1=Yes   3  Have you been hospitalized for one or more nights during the past 6 months (excluding a stay in the Emergency Department)? 0=No   4  In general, do you see well? 0=Yes   5  In general, do you have serious problems with your memory? 1=Yes   6  Do you take more than three different medications everyday?  1=Yes   TOTAL   4     Did you order a geriatric consult if the score was 2 or greater?: yes    Identification of Seniors at Risk      Most Recent Value   (ISAR) Identification of Seniors at Risk   Before the illness or injury that brought you to the Emergency, did you need someone to help you on a regular basis? 1 Filed at: 02/06/2021 0043   In the last 24 hours, have you needed more help than usual?  1 Filed at: 02/06/2021 2813   Have you been hospitalized for one or more nights during the past 6 months? 1 Filed at: 02/06/2021 0043   In general, do you see well? 1 Filed at: 02/06/2021 0043   In general, do you have serious problems with your memory? 1 Filed at: 02/06/2021 3337   Do you take more than three different medications every day? 1 Filed at: 02/06/2021 0043   ISAR Score  6 Filed at: 02/06/2021 3792            SUBJECTIVE:     Transfer from: [facility] she [facility]  Outside Films Received: no  Tertiary Exam Due on: 2/6/21    Mechanism of Injury:Fall    Details related to Injury: +LOC:  unknown    Chief Complaint: none    HPI/Last 24 hour events: seen in ER, Neurosurgery consulted, possibly at baseline, does say her name and follow a one step command inconsistently        Active medications:           Current Facility-Administered Medications:     acetaminophen (TYLENOL) tablet 975 mg, 975 mg, Oral, Q8H PRN    amLODIPine (NORVASC) tablet 5 mg, 5 mg, Oral, Daily    artificial tear (LUBRIFRESH P M ) ophthalmic ointment, , Both Eyes, HS    ascorbic acid (VITAMIN C) tablet 125 mg, 125 mg, Oral, Daily    bacitracin topical ointment 1 small application, 1 small application, Topical, BID    bisacodyl (DULCOLAX) rectal suppository 10 mg, 10 mg, Rectal, Daily PRN    carbidopa-levodopa (SINEMET)  mg per tablet 1 tablet, 1 tablet, Oral, TID    cholecalciferol (VITAMIN D3) tablet 1,000 Units, 1,000 Units, Oral, Daily    famotidine (PEPCID) tablet 20 mg, 20 mg, Oral, Daily    gabapentin (NEURONTIN) capsule 300 mg, 300 mg, Oral, TID PRN    levothyroxine tablet 125 mcg, 125 mcg, Oral, Early Morning    LORazepam (ATIVAN) tablet 0 5 mg, 0 5 mg, Oral, Q8H PRN    metoprolol tartrate (LOPRESSOR) tablet 25 mg, 25 mg, Oral, Q12H JEMAL    nystatin (MYCOSTATIN) powder, , Topical, BID PRN    polyethylene glycol (MIRALAX) packet 17 g, 17 g, Oral, Daily PRN    pramipexole (MIRAPEX) tablet 0 125 mg, 0 125 mg, Oral, HS    senna (SENOKOT) tablet 17 2 mg, 2 tablet, Oral, HS    sertraline (ZOLOFT) tablet 75 mg, 75 mg, Oral, HS    zinc sulfate (ZINCATE) capsule 220 mg, 220 mg, Oral, Daily    Current Outpatient Medications:     amLODIPine (NORVASC) 5 mg tablet    Ascorbic Acid (vitamin C) 100 MG tablet    aspirin 81 mg chewable tablet    carbidopa-levodopa (SINEMET)  mg per tablet    cholecalciferol (VITAMIN D3) 1,000 units tablet    famotidine (PEPCID) 20 mg tablet    gabapentin (NEURONTIN) 300 mg capsule    levothyroxine 125 mcg tablet    LORazepam (ATIVAN) 0 5 mg tablet    metoprolol tartrate (LOPRESSOR) 25 mg tablet    Polyethyl Glycol-Propyl Glycol (SYSTANE ULTRA PF OP)    pramipexole (MIRAPEX) 0 125 mg tablet    senna (SENOKOT) 8 6 MG tablet    sertraline (ZOLOFT) 25 mg tablet    zinc sulfate (ZINCATE) 220 mg capsule      OBJECTIVE:     Vitals:   Vitals:    02/06/21 0800   BP: 159/87   Pulse: 72   Resp: 16   Temp:    SpO2: 92%       Physical Exam:   GENERAL APPEARANCE: Left forehead laceration sutured aclosed  NEURO: GCS - 13 , Eyes - 4, Verbal - 4, Motor - 5  HEENT: Eom's intact  CV: RRR< no complaints of chest pain  LUNGS: Bascially CTA bilaterally, no respiratory distress  GI: bowel sounds audible  : voiding  MSK: moves extremities  SKIN: warm and dry    I/O:   I/O     None          Invasive Devices: Invasive Devices     Peripheral Intravenous Line            Peripheral IV 02/05/21 Right Forearm less than 1 day                  Imaging:   Ct Head Without Contrast    Result Date: 2/6/2021  Impression: No significant interval change since the prior examination  Stable small focus of high left frontoparietal subarachnoid and/or cortical hemorrhage  No new areas of acute intracranial hemorrhage identified   Workstation performed: [id number]       Labs: Results for Olman Duron (MRN [id number]) as of 2/6/2021 10:28   Ref   Range 2/6/2021 05:41   Sodium Latest Ref Range: 136 - 145 mmol/L 139   Potassium Latest Ref Range: 3 5 - 5 3 mmol/L 4 2   Chloride Latest Ref Range: 100 - 108 mmol/L 105   CO2 Latest Ref Range: 21 - 32 mmol/L 27   Anion Gap Latest Ref Range: 4 - 13 mmol/L 7   BUN Latest Ref Range: 5 - 25 mg/dL 28 (H)   Creatinine Latest Ref Range: 0 60 - 1 30 mg/dL 1 12   Glucose, Random Latest Ref Range: 65 - 140 mg/dL 117   Calcium Latest Ref Range: 8 3 - 10 1 mg/dL 9 1   AST Latest Ref Range: 5 - 45 U/L 13   ALT Latest Ref Range: 12 - 78 U/L 12   Alkaline Phosphatase Latest Ref Range: 46 - 116 U/L 63   Total Protein Latest Ref Range: 6 4 - 8 2 g/dL 7 2   Albumin Latest Ref Range: 3 5 - 5 0 g/dL 3 5   TOTAL BILIRUBIN Latest Ref Range: 0 20 - 1 00 mg/dL 0 47   eGFR Latest Units: ml/min/1 73sq m 40   WBC Latest Ref Range: 4 31 - 10 16 Thousand/uL 8 32   Red Blood Cell Count Latest Ref Range: 3 81 - 5 12 Million/uL 3 86   Hemoglobin Latest Ref Range: 11 5 - 15 4 g/dL 9 8 (L)   HCT Latest Ref Range: 34 8 - 46 1 % 32 1 (L)   MCV Latest Ref Range: 82 - 98 fL 83   MCH Latest Ref Range: 26 8 - 34 3 pg 25 4 (L)   MCHC Latest Ref Range: 31 4 - 37 4 g/dL 30 5 (L)   RDW Latest Ref Range: 11 6 - 15 1 % 16 2 (H)   Platelet Count Latest Ref Range: 149 - 390 Thousands/uL 303   MPV Latest Ref Range: 8 9 - 12 7 fL 9 0   nRBC Latest Units: /100 WBCs 0   Neutrophils % Latest Ref Range: 43 - 75 % 64   Immat GRANS % Latest Ref Range: 0 - 2 % 0   Lymphocytes Relative Latest Ref Range: 14 - 44 % 22   Monocytes Relative Latest Ref Range: 4 - 12 % 10   Eosinophils Latest Ref Range: 0 - 6 % 3   Basophils Relative Latest Ref Range: 0 - 1 % 1   Immature Grans Absolute Latest Ref Range: 0 00 - 0 20 Thousand/uL 0 02   Absolute Neutrophils Latest Ref Range: 1 85 - 7 62 Thousands/µL 5 41   Lymphocytes Absolute Latest Ref Range: 0 60 - 4 47 Thousands/µL 1 79   Absolute Monocytes Latest Ref Range: 0 17 - 1 22 Thousand/µL 0 79   Absolute Eosinophils Latest Ref Range: 0 00 - 0 61 Thousand/µL 0 27

## 2021-02-06 NOTE — ASSESSMENT & PLAN NOTE
-S/P laceration repair prior to transfer  -Wound care, topical Bacitracin BID  - area around wound cleansed and dried blood removed  There is a slight hematoma around the area      - Patient opens her eyes

## 2021-02-06 NOTE — ED PROVIDER NOTES
Emergency Department Trauma Note  Shawn Martins y o  female MRN: [id number]  Unit/Bed#: ED TR13/TR13B Encounter: [id number]      Trauma Alert: Trauma Acuity: Trauma Evaluation  Model of Arrival: Mode of Arrival: BLS via Trauma Squad Name and Number: [id number]  Trauma Team: Current Providers  Attending Provider: Joyce Banks DO  Consultants: None      History of Present Illness     Chief Complaint:   Chief Complaint   Patient presents with    Fall     Patient fell at [facility]  Landed on face, no loss of consciosness  Laceration to face, takes aspirin  HPI:  Shawn Ferrera is a [place] y o  female who presents with   Mechanical fall out of wheelchair  EMS report patient fell face 1st onto the floor  Baseline GCS of 14 for confusion /dementia  Upon arrival, patient is able to move all extremities, laceration noted to forehead  Patient is on aspirin daily  Mechanism:Details of Incident: per EMS patient fell out of wheelchair and fell face first onto floor Injury Date: 02/05/21 Injury Time: 2026(approx) Injury Occurence Location - [address] [place] [address] Way: Ray    HPI  Review of Systems   Skin: Positive for wound  All other systems reviewed and are negative  Historical Information     Immunizations:   Immunization History   Administered Date(s) Administered    Tdap 02/05/2021       Past Medical History:   Diagnosis Date    [facility] disease [facility]     Anxiety     Atherosclerotic heart disease of native coronary artery without angina pectoris     COPD (chronic obstructive pulmonary disease) ([facility]     Hypothyroidism     Long term (current) use of systemic steroids     Major depressive disorder     MI [facility]myocardial infarction) [facility]     Parkinson disease ([facility]     Spinal stenosis     TIA (transient ischemic attack)      History reviewed  No pertinent family history  History reviewed  No pertinent surgical history    Social History     Tobacco Use    Smoking status: Former Smoker    Smokeless tobacco: Never Used   Substance Use Topics    Alcohol use: Not Currently    Drug use: Not Currently     E-Cigarette/Vaping     E-Cigarette/Vaping Substances    Nicotine No     THC No     CBD No     Flavoring No     Other No     Unknown No        Family History: non-contributory    Meds/Allergies   Prior to Admission Medications   Prescriptions Last Dose Informant Patient Reported? Taking?    Ascorbic Acid (vitamin C) 100 MG tablet   Yes Yes   Sig: Take 100 mg by mouth daily   LORazepam (ATIVAN) 0 5 mg tablet   Yes Yes   Sig: Take by mouth every 8 (eight) hours as needed for anxiety   Polyethyl Glycol-Propyl Glycol (SYSTANE ULTRA PF OP)   Yes Yes   Sig: Apply 1 drop to eye borth eyes twice daily   amLODIPine (NORVASC) 5 mg tablet   Yes Yes   Sig: Take 5 mg by mouth daily   aspirin 81 mg chewable tablet   Yes Yes   Sig: Chew 81 mg daily   carbidopa-levodopa (SINEMET)  mg per tablet   Yes Yes   Sig: Take 1 tablet by mouth 3 (three) times a day   cholecalciferol (VITAMIN D3) 1,000 units tablet   Yes Yes   Sig: Take 1,000 Units by mouth daily   famotidine (PEPCID) 20 mg tablet   Yes Yes   Sig: Take 20 mg by mouth 2 (two) times a day   gabapentin (NEURONTIN) 300 mg capsule   Yes Yes   Sig: Take 300 mg by mouth 3 (three) times a day   levothyroxine 125 mcg tablet   Yes Yes   Sig: Take 125 mcg by mouth daily   metoprolol tartrate (LOPRESSOR) 25 mg tablet   Yes Yes   Sig: Take 25 mg by mouth every 12 (twelve) hours   pramipexole (MIRAPEX) 0 125 mg tablet   Yes Yes   Sig: Take 0 125 mg by mouth daily at bedtime   senna (SENOKOT) 8 6 MG tablet   Yes Yes   Sig: Take 1 tablet by mouth daily   sertraline (ZOLOFT) 25 mg tablet   Yes Yes   Sig: Take 75 mg by mouth daily at bedtime   zinc sulfate (ZINCATE) 220 mg capsule   Yes Yes   Sig: Take 220 mg by mouth daily      Facility-Administered Medications: None       No Known Allergies    PHYSICAL EXAM    PE limited by:   None    Objective   Vitals:   First set: Temperature: (!) 96 8 °F (36 °C) (02/05/21 2122)  Pulse: 64 (02/05/21 2122)  Respirations: 20 (02/05/21 2122)  Blood Pressure: (!) 178/74 (02/05/21 2122)  SpO2: (!) 77 % (02/05/21 2122)    Primary Survey:   (A) Airway: Intact  (B) Breathing: B/L breath sounds  (C) Circulation: Pulses:   normal  (D) Disabliity:  GCS Total:  14  (E) Expose:  Completed    Secondary Survey: (Click on Physical Exam tab above)  Physical Exam  Vitals signs and nursing note reviewed  Exam conducted with a chaperone present  Constitutional:       Appearance: She is well-developed  HENT:      Head: Normocephalic  Comments: 3 cm laceration on forehead  Eyes:      Conjunctiva/sclera: Conjunctivae normal    Neck:      Musculoskeletal: Normal range of motion and neck supple  Comments: No CT L-spine tenderness  Cardiovascular:      Rate and Rhythm: Normal rate and regular rhythm  Pulmonary:      Effort: Pulmonary effort is normal  No respiratory distress  Abdominal:      Palpations: Abdomen is soft  Tenderness: There is no abdominal tenderness  Musculoskeletal: Normal range of motion  General: No tenderness  Comments: Able to move all 4 extremities    Skin:     General: Skin is warm  Findings: No erythema  Neurological:      Mental Status: She is alert and oriented to person, place, and time  Psychiatric:         Behavior: Behavior normal          Cervical spine cleared by clinical criteria?  No (imaging required)      Invasive Devices     Peripheral Intravenous Line            Peripheral IV 02/05/21 Right Forearm less than 1 day                Lab Results:   Results Reviewed     Procedure Component Value Units Date/Time    COVID19, Influenza A/B, RSV PCR, [facility] [[id number]]     Lab Status: No result Specimen: Nares     Troponin I [[id number]]  (Normal) Collected: 02/05/21 2139    Lab Status: Final result Specimen: Blood from Arm, Right Updated: 02/05/21 2211     Troponin I <0 02 ng/mL     Basic metabolic panel [[id number]]  (Abnormal) Collected: 02/05/21 2139    Lab Status: Final result Specimen: Blood from Arm, Right Updated: 02/05/21 2210     Sodium 134 mmol/L      Potassium 4 4 mmol/L      Chloride 98 mmol/L      CO2 30 mmol/L      ANION GAP 6 mmol/L      BUN 23 mg/dL      Creatinine 0 98 mg/dL      Glucose 110 mg/dL      Calcium 8 8 mg/dL      eGFR 47 ml/min/1 73sq m     Narrative:      [place] guidelines for Chronic Kidney Disease (CKD):     Stage 1 with normal or high GFR (GFR > 90 mL/min/1 73 square meters)    Stage 2 Mild CKD (GFR = 60-89 mL/min/1 73 square meters)    Stage 3A Moderate CKD (GFR = 45-59 mL/min/1 73 square meters)    Stage 3B Moderate CKD (GFR = 30-44 mL/min/1 73 square meters)    Stage 4 Severe CKD (GFR = 15-29 mL/min/1 73 square meters)    Stage 5 End Stage CKD (GFR <15 mL/min/1 73 square meters)  Note: GFR calculation is accurate only with a steady state creatinine    CBC and differential [[id number]]  (Abnormal) Collected: 02/05/21 2139    Lab Status: Final result Specimen: Blood from Arm, Right Updated: 02/05/21 2151     WBC 7 70 Thousand/uL      RBC 4 26 Million/uL      Hemoglobin 10 8 g/dL      Hematocrit 35 5 %      MCV 83 fL      MCH 25 4 pg      MCHC 30 4 g/dL      RDW 15 9 %      MPV 9 0 fL      Platelets 903 Thousands/uL      nRBC 0 /100 WBCs      Neutrophils Relative 62 %      Immat GRANS % 0 %      Lymphocytes Relative 24 %      Monocytes Relative 8 %      Eosinophils Relative 5 %      Basophils Relative 1 %      Neutrophils Absolute 4 82 Thousands/µL      Immature Grans Absolute 0 02 Thousand/uL      Lymphocytes Absolute 1 84 Thousands/µL      Monocytes Absolute 0 63 Thousand/µL      Eosinophils Absolute 0 35 Thousand/µL      Basophils Absolute 0 04 Thousands/µL                  Imaging Studies:   Direct to CT: No  TRAUMA - CT spine cervical wo contrast   Final Result by Larisa Garcia MD (02/05 2258)      No cervical spine fracture or traumatic malalignment  Moderate multilevel spondylotic degenerative changes of the cervical spine  Workstation performed: [id number]         TRAUMA - CT head wo contrast   Final Result by Kely Amaro MD (02/05 2253)      5 mm focal hyperdensity at the left frontoparietal vertex which appears extra axial and likely represents a tiny focus of acute subarachnoid hemorrhage  Less likely, the findings may related to a tiny focus of cortical parenchymal hemorrhage  Moderate chronic small vessel ischemic changes and volume loss  I personally discussed this study with Lorrayne Cranker on 2/5/2021 at 10:47 PM                   Workstation performed: [id number]               Procedures  CriticalCare Time  Performed by: Josh Cavazos DO  Authorized by: Josh Cavazos DO     Critical care provider statement:     Critical care time (minutes):  32    Critical care time was exclusive of:  Separately billable procedures and treating other patients and teaching time    Critical care was necessary to treat or prevent imminent or life-threatening deterioration of the following conditions:  Trauma    Critical care was time spent personally by me on the following activities:  Blood draw for specimens, obtaining history from patient or surrogate, discussions with consultants, discussions with primary provider, development of treatment plan with patient or surrogate, evaluation of patient's response to treatment, ordering and performing treatments and interventions, ordering and review of laboratory studies, ordering and review of radiographic studies and re-evaluation of patient's condition  Laceration repair    Date/Time: 2/6/2021 12:02 PM  Performed by: Josh Cavazos DO  Authorized by:  Josh Cavazos DO   Risks and benefits: risks, benefits and alternatives were discussed  Patient understanding: patient states understanding of the procedure being performed  Body area: head/neck  Location details: forehead  Laceration length: 3 cm  Tendon involvement: none  Nerve involvement: none  Anesthesia: local infiltration    Anesthesia:  Local Anesthetic: lidocaine 1% with epinephrine    Sedation:  Patient sedated: no      Wound Dehiscence:  Superficial Wound Dehiscence: simple closure      Procedure Details:  Preparation: Patient was prepped and draped in the usual sterile fashion  Irrigation solution: saline  Irrigation method: jet lavage  Amount of cleaning: extensive  Skin closure: 5-0 Prolene  Number of sutures: 5  Approximation: close  Approximation difficulty: simple               ED Course           MDM  Number of Diagnoses or Management Options  Laceration of forehead, initial encounter:   SAH (subarachnoid hemorrhage) ([facility] ):   Diagnosis management comments:  8-year-old female with fall into forehead  Trauma evaluation called and patient straight to CT scan  Given patient's baseline confusion and inability to answer specific questions, will also obtain cardiac evaluation with labs, EKG  forehead laceration repaired  [place] updated  Call from Radiology with small subarachnoid hemorrhage  Administer DDAVP and transfer to [facility] trauma team           Disposition  [facility] Transfer: No  Final diagnoses:   SAH (subarachnoid hemorrhage) ([facility] )   Laceration of forehead, initial encounter     Time reflects when diagnosis was documented in both MDM as applicable and the Disposition within this note     Time User Action Codes Description Comment    2/5/2021 11:12 PM Howard Salter Add [I60 9] [address] (subarachnoid hemorrhage) ([facility] )     2/5/2021 11:12 PM Hylton, Michael Stern Laceration of forehead, initial encounter       ED Disposition     ED Disposition Condition Date/Time Comment    Transfer to Another Facility-In Network  Fri Feb 5, 2021 11:12 PM Can Slaughter should be transferred out to [facility]          MD Documentation      Most Recent Value   Patient Condition  The patient has been stabilized such that within reasonable medical probability, no material deterioration of the patient condition or the condition of the unborn child(elena) is likely to result from the transfer   Reason for Transfer  Level of Care needed not available at this facility   Benefits of Transfer  Specialized equipment and/or services available at the receiving facility (Include comment)________________________   Risks of Transfer  Potential for delay in receiving treatment, Potential deterioration of medical condition, Loss of IV, Increased discomfort during transfer   Accepting Physician  Dr Ronnie Lacy Name, Tati Pearson   Sending MD Corinne Cordia   Provider Certification  General risk, such as traffic hazards, adverse weather conditions, rough terrain or turbulence, possible failure of equipment (including vehicle or aircraft), or consequences of actions of persons outside the control of the transport personnel, Unanticipated needs of medical equipment and personnel during transport, Risk of worsening condition, The possibility of a transport vehicle being unavailable      RN Documentation      [address] Name, [address]   [facility]      Follow-up Information    None       Discharge Medication List as of 2/6/2021 12:30 AM      CONTINUE these medications which have NOT CHANGED    Details   amLODIPine (NORVASC) 5 mg tablet Take 5 mg by mouth daily, Historical Med      Ascorbic Acid (vitamin C) 100 MG tablet Take 100 mg by mouth daily, Historical Med      aspirin 81 mg chewable tablet Chew 81 mg daily, Historical Med      carbidopa-levodopa (SINEMET)  mg per tablet Take 1 tablet by mouth 3 (three) times a day, Historical Med      cholecalciferol (VITAMIN D3) 1,000 units tablet Take 1,000 Units by mouth daily, Historical Med      famotidine (PEPCID) 20 mg tablet Take 20 mg by mouth 2 (two) times a day, Historical Med      gabapentin (NEURONTIN) 300 mg capsule Take 300 mg by mouth 3 (three) times a day, Historical Med      levothyroxine 125 mcg tablet Take 125 mcg by mouth daily, Historical Med      LORazepam (ATIVAN) 0 5 mg tablet Take by mouth every 8 (eight) hours as needed for anxiety, Historical Med      metoprolol tartrate (LOPRESSOR) 25 mg tablet Take 25 mg by mouth every 12 (twelve) hours, Historical Med      Polyethyl Glycol-Propyl Glycol (SYSTANE ULTRA PF OP) Apply 1 drop to eye borth eyes twice daily, Historical Med      pramipexole (MIRAPEX) 0 125 mg tablet Take 0 125 mg by mouth daily at bedtime, Historical Med      senna (SENOKOT) 8 6 MG tablet Take 1 tablet by mouth daily, Historical Med      sertraline (ZOLOFT) 25 mg tablet Take 75 mg by mouth daily at bedtime, Historical Med      zinc sulfate (ZINCATE) 220 mg capsule Take 220 mg by mouth daily, Historical Med           No discharge procedures on file      PDMP Review     None          ED Provider  Electronically Signed by         Laura Valverde DO  02/06/21 0106

## 2021-02-06 NOTE — DISCHARGE SUMMARY
Discharge Summary - Gabriela Moreland 80 y o  female MRN: [id number]    Unit/Bed#: ED 27 Encounter: [id number]    Admission Date:   Admission Orders (From admission, onward)     Ordered        02/06/21 0116  INPATIENT ADMISSION  Once                     Admitting Diagnosis: Head injury [S09 90XA]    HPI: per resident:  Cee Hutchinson:  "Gabriela Moreland is a 80 y o  female with a past medical history of Parkinson's disease, dementia, HTN, CAD on 81mg ASA daily who initially presented to [place] [address] for fall from her wheelchair  She had a head CT which revealed small SAH, given DDAVP for reversal  She had a facial laceration that was repaired in the ED  subsequently [facility] evaluation  Further history and ROS unavailable secondary to dementia  "    Procedures Performed: No orders of the defined types were placed in this encounter  Summary of Hospital Course: 79 y/o female sent to ER after a fall fro standing, on Aspirin  GCS 13 - 14, waxes and wanes  Seen by Neurosurgery and HCT repeated and stable  Will be discharged back to facility, recommend stopping Aspirin and follow up with PCP and Neurosurgery  Does have left forehead sutures from laceration which can be removed by Trauma in 5-7 days or PCP  Significant Findings, Care, Treatment and Services Provided: Ct Head Without Contrast    Result Date: 2/6/2021  Impression: No significant interval change since the prior examination  Stable small focus of high left frontoparietal subarachnoid and/or cortical hemorrhage  No new areas of acute intracranial hemorrhage identified  Workstation performed: [id number]       Complications: none    Discharge Diagnosis: S/P Fall from Standing  Traumatic SAH    Resolved Problems  Date Reviewed: 2/6/2021    None          Condition at Discharge: stable         Discharge instructions/Information to patient and family:   See after visit summary for information provided to patient and family  Provisions for Follow-Up Care:  See after visit summary for information related to follow-up care and any pertinent home health orders  PCP: Keren Vinson MD    Disposition: [place]    Planned Readmission: No      Discharge Statement   I spent 30 minutes discharging the patient  This time was spent on the day of discharge  I had direct contact with the patient on the day of discharge  Additional documentation is required if more than 30 minutes were spent on discharge  Discharge Medications:  See after visit summary for reconciled discharge medications provided to patient and family

## 2021-02-07 LAB
ATRIAL RATE: 60 BPM
P AXIS: 50 DEGREES
PR INTERVAL: 210 MS
QRS AXIS: -35 DEGREES
QRSD INTERVAL: 96 MS
QT INTERVAL: 442 MS
QTC INTERVAL: 442 MS
T WAVE AXIS: -54 DEGREES
VENTRICULAR RATE: 60 BPM

## 2021-02-07 PROCEDURE — 93010 ELECTROCARDIOGRAM REPORT: CPT | Performed by: INTERNAL MEDICINE

## 2021-02-08 NOTE — TELEPHONE ENCOUNTER
This nurse called [facility] back and spoke with patient's nurse who does not know an Kervin Shine there to have me speak with but does not feel there is need for a discussion with Palliative Care at this time

## 2021-02-17 NOTE — PHYSICIAN ADVISOR
Current patient class: Inpatient  The patient is currently on Hospital Day: 2 at [facility]      The patient was admitted to the hospital  on 2/6/21 at [facility] for the following diagnosis:  Head injury [S09 90XA]     After review of the relevant documentation, labs, vital signs and test results, this is a PROVIDER LIABLE case  In this particular case the patient was admitted to the hospital as an inpatient  The patient however failed to satisfy the 2 midnight benchmark and closer scrutiny of the case was warranted  After review of the patient presentation and relevant labs the patient was most appropriate for observation or outpatient class at the time of admission  Given that this patient has already been discharged prior to this review they become a provider liable case  Rationale is as follows: The patient is a Luisstad yrs   Female who presented to the ED at 2/6/2021 12:31 AM with a chief complaint of Fall (pt transferred from [facility]- coming from nursing home  witnessed fall/faceplant  -LOC +baby aspirin)  Patient was seen by Neurosurgery and admitted to the trauma service  Patient was noted to have a subarachnoid hemorrhage for which  Her anti thrombotic for stopped  No intervention was done the patient was discharged later that evening on 2/6/2021  Given that the patient was observed throughout the day and discharged later that evening with no acute intervention the patient was most appropriate for observation status on admission  This case is provider liable      The patients vitals on arrival were   ED Triage Vitals   Temperature Pulse Respirations Blood Pressure SpO2   02/06/21 0050 02/06/21 0050 02/06/21 0050 02/06/21 0050 02/06/21 0045   (!) 97 3 °F (36 3 °C) 70 18 129/62 96 %      Temp Source Heart Rate Source Patient Position - Orthostatic VS BP Location FiO2 (%)   02/06/21 0050 02/06/21 0050 02/06/21 0050 02/06/21 0404 --   Oral Monitor Sitting Right arm       Pain Score       --                  Past Medical History:   Diagnosis Date    Alzheimer disease ([place] [address] )     Anxiety     Atherosclerotic heart disease of native coronary artery without angina pectoris     COPD (chronic obstructive pulmonary disease) ([place] [address] )     Hypothyroidism     Long term (current) use of systemic steroids     Major depressive disorder     MI (myocardial infarction) ([place] [address] )     Parkinson disease ([place] [address] )     Spinal stenosis     TIA (transient ischemic attack)      History reviewed  No pertinent surgical history  Consults have been placed to:   IP CONSULT TO NEUROSURGERY    Vitals:    02/06/21 1100 02/06/21 1200 02/06/21 1230 02/06/21 1300   BP: 164/70 148/77 148/77 136/60   BP Location:       Pulse: 80 84 82 72   Resp: 20 20 20 20   Temp:       TempSrc:       SpO2: 98% 95% 95% 96%       Most recent labs:    No results for input(s): WBC, HGB, HCT, PLT, K, NA, CALCIUM, BUN, CREATININE, LIPASE, AMYLASE, INR, TROPONINI, CKTOTAL, AST, ALT, ALKPHOS, BILITOT in the last 72 hours  Scheduled Meds:  Continuous Infusions:No current facility-administered medications for this encounter  PRN Meds:      Surgical procedures (if appropriate):